# Patient Record
Sex: MALE | Race: BLACK OR AFRICAN AMERICAN
[De-identification: names, ages, dates, MRNs, and addresses within clinical notes are randomized per-mention and may not be internally consistent; named-entity substitution may affect disease eponyms.]

---

## 2018-07-09 ENCOUNTER — HOSPITAL ENCOUNTER (EMERGENCY)
Dept: HOSPITAL 62 - ER | Age: 58
Discharge: HOME | End: 2018-07-09
Payer: COMMERCIAL

## 2018-07-09 VITALS — DIASTOLIC BLOOD PRESSURE: 93 MMHG | SYSTOLIC BLOOD PRESSURE: 171 MMHG

## 2018-07-09 DIAGNOSIS — M25.511: ICD-10-CM

## 2018-07-09 DIAGNOSIS — M25.532: ICD-10-CM
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
## 2018-07-09 DIAGNOSIS — I10: ICD-10-CM

## 2018-07-09 DIAGNOSIS — J44.9: ICD-10-CM

## 2018-07-09 DIAGNOSIS — M25.512: ICD-10-CM

## 2018-07-09 DIAGNOSIS — M50.20: Primary | ICD-10-CM

## 2018-07-09 DIAGNOSIS — F17.200: ICD-10-CM

## 2018-07-09 PROCEDURE — 99284 EMERGENCY DEPT VISIT MOD MDM: CPT

## 2018-07-09 PROCEDURE — 73030 X-RAY EXAM OF SHOULDER: CPT

## 2018-07-09 PROCEDURE — 73110 X-RAY EXAM OF WRIST: CPT

## 2018-07-09 PROCEDURE — 96372 THER/PROPH/DIAG INJ SC/IM: CPT

## 2018-07-09 PROCEDURE — 72125 CT NECK SPINE W/O DYE: CPT
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
## 2019-07-18 ENCOUNTER — HOSPITAL ENCOUNTER (OUTPATIENT)
Dept: HOSPITAL 62 - OD | Age: 59
End: 2019-07-18
Attending: PEDIATRICS
Payer: MEDICAID

## 2019-07-18 DIAGNOSIS — G89.29: Primary | ICD-10-CM

## 2019-07-18 DIAGNOSIS — M25.512: ICD-10-CM

## 2019-07-18 LAB
ALBUMIN SERPL-MCNC: 3.8 G/DL (ref 3.5–5)
ALP SERPL-CCNC: 86 U/L (ref 38–126)
ALT SERPL-CCNC: 20 U/L (ref 21–72)
ANION GAP SERPL CALC-SCNC: 7 MMOL/L (ref 5–19)
AST SERPL-CCNC: 18 U/L (ref 17–59)
BILIRUB DIRECT SERPL-MCNC: 0.2 MG/DL (ref 0–0.4)
BILIRUB SERPL-MCNC: 0.5 MG/DL (ref 0.2–1.3)
BUN SERPL-MCNC: 15 MG/DL (ref 7–20)
CALCIUM: 9 MG/DL (ref 8.4–10.2)
CHLORIDE SERPL-SCNC: 107 MMOL/L (ref 98–107)
CHOLEST SERPL-MCNC: 179.68 MG/DL (ref 0–200)
CO2 SERPL-SCNC: 28 MMOL/L (ref 22–30)
ERYTHROCYTE [DISTWIDTH] IN BLOOD BY AUTOMATED COUNT: 14.1 % (ref 11.5–14)
GLUCOSE SERPL-MCNC: 93 MG/DL (ref 75–110)
HCT VFR BLD CALC: 41 % (ref 37.9–51)
HGB BLD-MCNC: 13.9 G/DL (ref 13.5–17)
LDLC SERPL DIRECT ASSAY-MCNC: 127 MG/DL (ref ?–100)
MCH RBC QN AUTO: 28.9 PG (ref 27–33.4)
MCHC RBC AUTO-ENTMCNC: 33.8 G/DL (ref 32–36)
MCV RBC AUTO: 85 FL (ref 80–97)
PLATELET # BLD: 264 10^3/UL (ref 150–450)
POTASSIUM SERPL-SCNC: 4.5 MMOL/L (ref 3.6–5)
PROT SERPL-MCNC: 6.7 G/DL (ref 6.3–8.2)
PSA SERPL-MCNC: 1.49 NG/ML (ref ?–4)
RBC # BLD AUTO: 4.8 10^6/UL (ref 4.35–5.55)
SODIUM SERPL-SCNC: 141.7 MMOL/L (ref 137–145)
TRIGL SERPL-MCNC: 77 MG/DL (ref ?–150)
VLDLC SERPL CALC-MCNC: 15 MG/DL (ref 10–31)
WBC # BLD AUTO: 6.7 10^3/UL (ref 4–10.5)

## 2019-07-18 PROCEDURE — 83036 HEMOGLOBIN GLYCOSYLATED A1C: CPT

## 2019-07-18 PROCEDURE — 80053 COMPREHEN METABOLIC PANEL: CPT

## 2019-07-18 PROCEDURE — 84153 ASSAY OF PSA TOTAL: CPT

## 2019-07-18 PROCEDURE — 80061 LIPID PANEL: CPT

## 2019-07-18 PROCEDURE — 85027 COMPLETE CBC AUTOMATED: CPT

## 2019-07-18 PROCEDURE — 36415 COLL VENOUS BLD VENIPUNCTURE: CPT

## 2020-01-30 ENCOUNTER — HOSPITAL ENCOUNTER (OUTPATIENT)
Dept: HOSPITAL 62 - RAD | Age: 60
End: 2020-01-30
Attending: FAMILY MEDICINE
Payer: MEDICAID

## 2020-01-30 DIAGNOSIS — M54.5: ICD-10-CM

## 2020-01-30 DIAGNOSIS — M47.816: Primary | ICD-10-CM

## 2020-01-30 DIAGNOSIS — M25.551: ICD-10-CM

## 2020-01-30 PROCEDURE — 72110 X-RAY EXAM L-2 SPINE 4/>VWS: CPT

## 2020-01-30 NOTE — RADIOLOGY REPORT (SQ)
EXAM DESCRIPTION:  HIP RIGHT AP/LATERAL



COMPLETED DATE/TIME:  1/30/2020 12:27 pm



REASON FOR STUDY:  R HIP JOINT M54.5  LOW BACK PAIN M25.551  PAIN IN RIGHT HIP



COMPARISON:  Lumbar spine five views 1/30/2020



NUMBER OF VIEWS:  Two views.



TECHNIQUE:  AP pelvis and additional frog-leg view of the right hip.



LIMITATIONS:  None.



FINDINGS:  MINERALIZATION: Normal.

RIGHT HIP: No fracture or dislocation.  No worrisome bone lesions.

LEFT HIP: No fracture or dislocation.  No worrisome bone lesions.

PUBIS AND ISCHIUM: No fracture.

PELVIS: No fracture.

SACRUM: No fracture or dislocation. No worrisome bone lesions.

SOFT TISSUES: No findings.

OTHER: No other significant finding.



IMPRESSION:  NEGATIVE STUDY OF THE RIGHT HIP. NO RADIOGRAPHIC EVIDENCE OF ACUTE INJURY.



TECHNICAL DOCUMENTATION:  JOB ID:  [id number]

 2011 [employer]- All Rights Reserved



Reading location - IP/workstation name: [email or web address]

## 2020-01-30 NOTE — RADIOLOGY REPORT (SQ)
EXAM DESCRIPTION:  L SPINE WHOLE



COMPLETED DATE/TIME:  1/30/2020 12:27 pm



REASON FOR STUDY:  LOW BACK PAIN M54.5  LOW BACK PAIN M25.551  PAIN IN RIGHT HIP



COMPARISON:  Right hip films same date



NUMBER OF VIEWS:  Five views including obliques.



TECHNIQUE:  AP, lateral, oblique, and sacral radiographic images acquired of the lumbar spine.



LIMITATIONS:  None.



FINDINGS:  MINERALIZATION: Normal.

SEGMENTATION: 6 lumbar vertebral bodies are present.  Most inferior well-developed disc space will be
 labeled L5-S1.  This puts a transitional segment with short ribs/ long transverse processes at the t
horacic - lumbar junction

ALIGNMENT: Grade 1 anterolisthesis of L4 over L5

VERTEBRAE: Maintained height.  No fracture or worrisome bone lesion.

DISCS: Disc space loss of height at L1-2 and L4-5

POSTERIOR ELEMENTS: No pars defect.  Advanced facet arthropathy at L4-5 and L5-S1

HARDWARE: None in the spine.

PARASPINAL SOFT TISSUES: Normal.

PELVIS: SI joints intact

OTHER: No other significant finding.



IMPRESSION:  Lower lumbar facet arthropathy.  Degenerative grade 1 anterolisthesis of L4 over L5.

Transitional anatomy, most inferior well-developed disc space is labeled L5-S1



TECHNICAL DOCUMENTATION:  JOB ID:  [id number]

 2011 [employer]- All Rights Reserved



Reading location - IP/workstation name: [email or web address]